# Patient Record
Sex: MALE | Race: OTHER | HISPANIC OR LATINO
[De-identification: names, ages, dates, MRNs, and addresses within clinical notes are randomized per-mention and may not be internally consistent; named-entity substitution may affect disease eponyms.]

---

## 2019-09-25 ENCOUNTER — APPOINTMENT (OUTPATIENT)
Dept: OTOLARYNGOLOGY | Facility: CLINIC | Age: 34
End: 2019-09-25
Payer: SELF-PAY

## 2019-09-25 PROBLEM — Z00.00 ENCOUNTER FOR PREVENTIVE HEALTH EXAMINATION: Status: ACTIVE | Noted: 2019-09-25

## 2019-09-25 PROCEDURE — Z3600: CPT | Mod: NC

## 2019-09-25 PROCEDURE — 92593: CPT | Mod: NC

## 2019-11-04 ENCOUNTER — APPOINTMENT (OUTPATIENT)
Dept: OTOLARYNGOLOGY | Facility: CLINIC | Age: 34
End: 2019-11-04

## 2019-11-11 ENCOUNTER — APPOINTMENT (OUTPATIENT)
Dept: OTOLARYNGOLOGY | Facility: CLINIC | Age: 34
End: 2019-11-11
Payer: SELF-PAY

## 2019-11-11 PROCEDURE — V5265A: CUSTOM

## 2019-11-11 PROCEDURE — 92593: CPT | Mod: NC

## 2020-10-09 ENCOUNTER — APPOINTMENT (OUTPATIENT)
Dept: OTOLARYNGOLOGY | Facility: CLINIC | Age: 35
End: 2020-10-09

## 2020-10-12 ENCOUNTER — APPOINTMENT (OUTPATIENT)
Dept: OTOLARYNGOLOGY | Facility: CLINIC | Age: 35
End: 2020-10-12
Payer: SELF-PAY

## 2020-10-12 PROCEDURE — 92593: CPT | Mod: NC

## 2020-11-04 ENCOUNTER — APPOINTMENT (OUTPATIENT)
Dept: OTOLARYNGOLOGY | Facility: CLINIC | Age: 35
End: 2020-11-04
Payer: SELF-PAY

## 2020-11-04 PROCEDURE — V5264A: CUSTOM

## 2021-11-29 ENCOUNTER — APPOINTMENT (OUTPATIENT)
Dept: OTOLARYNGOLOGY | Facility: CLINIC | Age: 36
End: 2021-11-29
Payer: SELF-PAY

## 2021-11-29 PROCEDURE — V5264A: CUSTOM

## 2021-11-29 PROCEDURE — 92593: CPT | Mod: NC

## 2021-12-14 ENCOUNTER — APPOINTMENT (OUTPATIENT)
Dept: OTOLARYNGOLOGY | Facility: CLINIC | Age: 36
End: 2021-12-14
Payer: SELF-PAY

## 2021-12-14 PROCEDURE — 92593: CPT | Mod: NC

## 2022-01-04 ENCOUNTER — APPOINTMENT (OUTPATIENT)
Dept: OTOLARYNGOLOGY | Facility: CLINIC | Age: 37
End: 2022-01-04
Payer: SELF-PAY

## 2022-01-04 PROCEDURE — 92593: CPT | Mod: NC

## 2023-04-07 ENCOUNTER — APPOINTMENT (OUTPATIENT)
Dept: OTOLARYNGOLOGY | Facility: CLINIC | Age: 38
End: 2023-04-07
Payer: SELF-PAY

## 2023-04-07 PROCEDURE — 92593: CPT | Mod: NC

## 2023-05-22 ENCOUNTER — APPOINTMENT (OUTPATIENT)
Dept: OTOLARYNGOLOGY | Facility: CLINIC | Age: 38
End: 2023-05-22
Payer: SELF-PAY

## 2023-05-22 DIAGNOSIS — H61.23 IMPACTED CERUMEN, BILATERAL: ICD-10-CM

## 2023-05-22 DIAGNOSIS — H93.293 OTHER ABNORMAL AUDITORY PERCEPTIONS, BILATERAL: ICD-10-CM

## 2023-05-22 PROCEDURE — 99203 OFFICE O/P NEW LOW 30 MIN: CPT | Mod: 25

## 2023-05-22 PROCEDURE — 69210 REMOVE IMPACTED EAR WAX UNI: CPT

## 2023-05-22 NOTE — HISTORY OF PRESENT ILLNESS
[de-identified] : He is well-known to the hearing [facility].  He uses bilateral hearing aid amplification.\par \par History of recurrent bilateral cerumen impaction.\par Complaining of bilateral ear congestion.

## 2023-12-15 ENCOUNTER — APPOINTMENT (OUTPATIENT)
Dept: OTOLARYNGOLOGY | Facility: CLINIC | Age: 38
End: 2023-12-15

## 2023-12-21 ENCOUNTER — APPOINTMENT (OUTPATIENT)
Dept: OTOLARYNGOLOGY | Facility: CLINIC | Age: 38
End: 2023-12-21
Payer: SELF-PAY

## 2023-12-21 PROCEDURE — 92592: CPT | Mod: NC

## 2024-01-04 ENCOUNTER — APPOINTMENT (OUTPATIENT)
Dept: OTOLARYNGOLOGY | Facility: CLINIC | Age: 39
End: 2024-01-04
Payer: SELF-PAY

## 2024-01-04 PROCEDURE — V5264E: CUSTOM
